# Patient Record
Sex: MALE | Race: BLACK OR AFRICAN AMERICAN | URBAN - METROPOLITAN AREA
[De-identification: names, ages, dates, MRNs, and addresses within clinical notes are randomized per-mention and may not be internally consistent; named-entity substitution may affect disease eponyms.]

---

## 2020-09-01 ENCOUNTER — HISTORICAL (OUTPATIENT)
Dept: LAB | Facility: HOSPITAL | Age: 80
End: 2020-09-01

## 2020-09-13 ENCOUNTER — HOSPITAL ENCOUNTER (OUTPATIENT)
Dept: MEDSURG UNIT | Facility: HOSPITAL | Age: 80
End: 2020-09-17
Attending: INTERNAL MEDICINE | Admitting: INTERNAL MEDICINE

## 2021-06-22 ENCOUNTER — HISTORICAL (OUTPATIENT)
Dept: ADMINISTRATIVE | Facility: HOSPITAL | Age: 81
End: 2021-06-22

## 2021-06-29 ENCOUNTER — HISTORICAL (OUTPATIENT)
Dept: ADMINISTRATIVE | Facility: HOSPITAL | Age: 81
End: 2021-06-29

## 2021-07-06 ENCOUNTER — HISTORICAL (OUTPATIENT)
Dept: ADMINISTRATIVE | Facility: HOSPITAL | Age: 81
End: 2021-07-06

## 2021-07-07 ENCOUNTER — HISTORICAL (OUTPATIENT)
Dept: ADMINISTRATIVE | Facility: HOSPITAL | Age: 81
End: 2021-07-07

## 2022-04-30 NOTE — DISCHARGE SUMMARY
Patient:   Mo Cao             MRN: [id number]            FIN: [id number]               Age:   79 years     Sex:  Male     :  1940   Associated Diagnoses:   Acute CHF (congestive heart failure); Chronic dementia; Diabetes; CAD (coronary artery disease); Protein calorie malnutrition; Hematuria; General weakness; Dehydration   Author:   Jimi Harris MD      Discharge Information      Discharge Summary Information   Admitted  2020   Discharged  2020   Admitting physician     Lakisha Blake MD.     Consulting physician     Suhas Bradshaw MD     Discharge diagnosis     Acute CHF (congestive heart failure) (MGB74-AQ I50.9).     Chronic dementia (TRR32-QW F03.90).     Diabetes (JDY76-DJ E11.9).     CAD (coronary artery disease) (PTQ02-MM I25.10).     Protein calorie malnutrition (NUI62-OR E46).     Hematuria (HDU68-LO R31.9).     General weakness (RMV71-GP R53.1).     Dehydration (USJ13-JX E86.0).     Discharge medications     OTHER MEDICATIONS (Selected)   Prescriptions  Prescribed  Lasix 40 mg oral tablet: 40 mg = 1 tab(s), Oral, Daily, # 30 tab(s), 0 Refill(s), Pharmacy: [id number], 175, cm, Height/Length Dosing, 20 14:46:00 CDT, 84, kg, Weight Dosing, 20 14:46:00 CDT  Documented Medications  Documented  Plavix 75 mg oral tablet: 75 mg = 1 tab(s), Oral, Daily, # 90 tab(s), 0 Refill(s)  ProAir HFA 90 mcg/inh inhalation aerosol with adapter: 90 mcg = 1 puff(s), INH, q4hr, PRN PRN shortness of breath or wheezing, 0 Refill(s)  atorvastatin 40 mg oral tablet: 40 mg = 1 tab(s), Oral, Daily, 0 Refill(s)  carvedilol 6.25 mg oral tablet: 6.25 mg = 1 tab(s), Oral, BID, # 180 tab(s), 0 Refill(s)  donepezil 23 mg oral tablet: 23 mg = 1 tab(s), Oral, Once a day (at bedtime), # 30 tab(s), 0 Refill(s)  hydrALAZINE-isosorbide dinitrate: Oral, TID, 0 Refill(s)  memantine 10 mg oral tablet: 10 mg = 1 tab(s), Oral, BID, # 60 tab(s), 0 Refill(s)  tamsulosin 0.4 mg oral  capsule: 0.4 mg = 1 cap(s), Oral, Daily, # 90 cap(s), 0 Refill(s).        Physical Examination   Vital Signs   9/17/2020 8:00 CDT       Oxygen Therapy            Room air    9/17/2020 7:59 CDT       Temperature Oral          37 DegC                             Temperature Oral (calculated)             98.60 DegF                             Peripheral Pulse Rate     76 bpm                             SpO2                      96 %                             Systolic Blood Pressure   143 mmHg  HI                             Diastolic Blood Pressure  71 mmHg                             Mean Arterial Pressure, Cuff              95 mmHg     General: demented AAM in no acute distress, non-productive cough with insp  Cardiovascular: Regular rate and rhythm, normal peripheral perfusion, BLE edema 2+   Respiratory: CTAB, no tachypnea or accessory muscle use   Abdomen: Soft, nontender, nondistended with positive bowel sounds   Extremities: Warm and well perfused, no clubbing or cyanosis   Neuro: Grossly without focal deficits       Hospital Course     Mr. Cao is a 80 yo AAM with pmhx DMII, HTN, CAD, dementia, BPH and HLD presents to the ED with family with report of dark colored urine and decreased appetite. Patient was just discharged on our service on 9/8 after being admitted for acute renal failure secondary to ATN secondary to dehydration.  His initial creatinine was 11 and improved with IV fluids and discontinuation of diuretics and on discharge his creatinine was 2.4. He was also noted to be iron deficient and given 2 units prbc.  He presents with low appetite and the family is having a very difficult time caring for him and therefore they're requesting nursing home placement.    Initial ED vital signs: Temperature 37, heart rate 82, respirations 18, blood pressure 173/71, oxygenation 99% room air.  Laboratory work remarkable for sodium of 149, chloride 117, CO2 19, creatinine 1.8, , hemoglobin 8.8,  hematocrit 28.9.  UA positive nitrites, trace leuk esterase, 5 WBCs with no bacteria.  Patient was given 2 L of IV fluids in the emergency department and Rocephin and is being admitted to the hospitalist service for further management.  When patient came on the floor he started coughing and became hypoxic with an oxygenation in the 70s and was placed on 2 L nasal cannula with improvement in his oxygenation.  He endorsed orthopnea and does have 2+ bilateral lower extremity edema.  He was on Lasix prior to his last hospitalization it is unclear if he has a history of CHF however his Lasix was discontinued upon discharge.  COVID-19 PCR was negative     Echocardiogram 9/14/2020 showed normal LV size and severely decreased function with EF 25 to 30%.  There was moderate tricuspid regurgitation with RVSP of 61%.  He is responding well to Lasix.  Repeat x-ray showed prominent interstitial markings with no focal opacification and no adverse interval changes. ALsix dose reduced to daily IV due to increased Scr. Cardiology consulted. recommended continue Coreg, Plavix added BiDil and titrate dose as needed. Life vest was not recommended due to dementia. Urine cultures showed Staphylococcus epidermidis likely contaminant. Ceftriaxone discontinued.  BiDil dose was increased due to uncontrolled hypertension.  Will be discharged on 40 mg p.o. Lasix daily.    Patient respiratory symptoms have improved with diuresis and bronchodilator therapy.  His hypoxia resolved and transitioned to room air.  Diet advance as tolerated.   was able to place him in a nursing home.  Prior to discharge all his medications were reconciled.    Acute on chronic systolic decompensated CHF- 25-30%   Acute hypoxic respiratory failure due to above, resolved   UTI (POA), culture showing staph epidermidis, likely contaminant   Acute Kidney Injury, resolved   Metabolic acidosis, non-gap, resolved with Po bicarb, resolved   Chronic Dementia   CAD    T2DM   PCM with poor oral intake   Recent admission for ALEXANDER 2/2 ATN 2/2 dehydration/diuretics   SARS CoV2 UT negative    Time spent on discharge >30 minutes       Discharge Plan   Discharge Summary Plan   Discharge Status: improved.     Discharge instructions given: to patient, written discharge instructions (activity level, diet, follow-up appointment, medications).     Discharge disposition: discharge to skilled nursing facility Nursing Home.     Prescriptions: reviewed with patient, written and given to patient.     Course   Improving.     Progressing as expected.     Well controlled.     Education and Follow-up   Counseled: patient, regarding diagnosis, regarding treatment, regarding medications.     Discharge Planning: Protein-Energy Malnutrition, Living With Heart Failure, Cardiac Rehabilitation, Dementia Caregiver Guide, Dehydration, Adult, Xavier Rodriguez Within 1 week.

## 2022-04-30 NOTE — ED PROVIDER NOTES
"   Patient:   Mo Cao             MRN: [id number]            FIN: [id number]               Age:   79 years     Sex:  Male     :  1940   Associated Diagnoses:   General weakness; Hematuria; Acute UTI; Dehydration   Author:   Sam Goncalves MD      Basic Information   Time seen: Date & time 2020 14:46:00.   History source: Patient, family.   Arrival mode: Private vehicle, wheelchair.   History limitation: Cognitive impairment, Dementia.      History of Present Illness   The patient presents with hematuria, Patient has Dementia. Patient's family states that patient has hematuria and states that he has a decreased appetite (kathy, JOSIAH). ,     Dr Goncalves assumed care 1500    79 male h/o type 2 diabetes, CAD, BPH, possibly congestive heart failure discharge from the hospital 5 days ago after he was admitted with acute renal failure thought to be secondary to ATN due to volume depletion.  On his previous admission creatinine was 11  potassium was elevated at 5.8 phosphorus was 6.9.  At the time he was on Lasix and lactone which were stopped.  Lisinopril resumed when he was discharged.  He returns emergency department today stating that his urine is black.  Also again presents with decreased appetite and decreased oral intake. After further discussion with family patient was sent for worsening weakness, not eating, and multiple falls. At this time the family is not comfortable taking the patient home.  She states she cannot take care of him any longer.  He would not use bedside commode cannot walk to the bathroom cannot or his own meals or care for himself at all.  Any falls at home which is been happening frequently she cannot pick him up and has to call and help.  She requests that they be evaluated for nursing home placement.  The onset was 5  days ago.  The course/duration of symptoms is constant.  Character of hematuria "black".  The degree at onset was moderate.  The degree at " present is moderate.  The exacerbating factor is none.  The relieving factor is none.  Risk factors consist of diabetes mellitus.  Prior episodes: none.  Therapy today: none.  Associated symptoms: none.  Additional history: none.        Review of Systems   Constitutional symptoms:  Negative except as documented in HPI.   Skin symptoms:  Negative except as documented in HPI.   Eye symptoms:  Negative except as documented in HPI.   ENMT symptoms:  Negative except as documented in HPI.   Respiratory symptoms:  Negative except as documented in HPI.   Cardiovascular symptoms:  Negative except as documented in HPI.   Gastrointestinal symptoms:  Negative except as documented in HPI.   Genitourinary symptoms:  Negative except as documented in HPI.   Musculoskeletal symptoms:  Negative except as documented in HPI.   Neurologic symptoms:  Negative except as documented in HPI.   , Unable to obtain due to: Dementia, unable to obtain due to dementia   Health Status   Allergies:    Allergic Reactions (Selected)  No Known Medication Allergies.   Medications:  (Selected)   Documented Medications  Documented  Plavix 75 mg oral tablet: 75 mg = 1 tab(s), Oral, Daily, # 90 tab(s), 0 Refill(s)  atorvastatin 10 mg oral tablet: 10 mg = 1 tab(s), Oral, Daily, # 90 tab(s), 0 Refill(s)  baclofen 10 mg oral tablet: 10 mg = 1 tab(s), Oral, TID, PRN PRN pain, # 90 tab(s), 0 Refill(s)  carvedilol 6.25 mg oral tablet: 6.25 mg = 1 tab(s), Oral, BID, # 180 tab(s), 0 Refill(s)  donepezil 23 mg oral tablet: 23 mg = 1 tab(s), Oral, Once a day (at bedtime), # 30 tab(s), 0 Refill(s)  lisinopril 5 mg oral tablet: 5 mg = 1 tab(s), Oral, Daily, # 90 tab(s), 0 Refill(s)  memantine 10 mg oral tablet: 10 mg = 1 tab(s), Oral, BID, # 60 tab(s), 0 Refill(s)  montelukast 10 mg oral TABLET: 10 mg = 1 tab(s), Oral, qPM, # 30 tab(s), 0 Refill(s)  tamsulosin 0.4 mg oral capsule: 0.4 mg = 1 cap(s), Oral, Daily, # 90 cap(s), 0 Refill(s).      Past Medical/ Family/  Social History   Medical history:    Active  BPH - benign prostatic hyperplasia (1590579600)  DM - Diabetes mellitus (354049010)  HLD - Hyperlipidemia (585969265)  HTN - Hypertension (0226065519)  Dementia (22501470).   Surgical history:    No active procedure history items have been selected or recorded..   Family history:    No family history items have been selected or recorded..   Social history: Tobacco use: Denies, Family/social situation: Unmarried.      Physical Examination               Vital Signs   Vital Signs   9/13/2020 15:15 CDT      Peripheral Pulse Rate     83 bpm                             Heart Rate Monitored      82 bpm                             Respiratory Rate          20 br/min                             SpO2                      99 %                             Oxygen Therapy            Room air                             Systolic Blood Pressure   162 mmHg  HI                             Diastolic Blood Pressure  82 mmHg                             Mean Arterial Pressure, Cuff              109 mmHg  .   Measurements   9/13/2020 14:46 CDT      Weight Dosing             84 kg                             Weight Measured and Calculated in Lbs     185.19 lb                             Weight Estimated          84 kg                             Height/Length Dosing      175 cm                             Height/Length Estimated   175 cm                             Body Mass Index Estimated 27.43 kg/m2  .   Basic Oxygen Information   9/13/2020 15:15 CDT      SpO2                      99 %                             Oxygen Therapy            Room air  General:  Alert, no acute distress   Neurological:  No focal neurological deficit observed, normal speech observed, Cognitive function: To person.    Skin:  Warm, dry   Head:  Normocephalic, atraumatic   Eye:     Cardiovascular:  Regular rate and rhythm, Normal peripheral perfusion, Edema: Bilateral, lower extremity, pitting.    Respiratory:  Lungs  "are clear to auscultation, respirations are non-labored, breath sounds are equal, Symmetrical chest wall expansion.    Musculoskeletal:  No deformity   Gastrointestinal:  Soft, Nontender, Non distended, Normal bowel sounds, Guarding: Negative, Rebound: Negative.    Yellow urine in a container at bedside.Psychiatric:  Cooperative      Medical Decision Making   Differential Diagnosis:  Cystitis, hematuria.    Rationale:  Creatinine is improving from previous.  H&H slightly better than previous.  He had retroperitoneal ultrasound which was unremarkable from his previous admission I reviewed the radiologist interpretation of that.  Suspect he has a UTI.  I given him a dose of Rocephin in the ED.  Discussed with the hospitalist regarding admission for possible nursing home placement as his family cannot take care of him at home.  I discussed with her that treating the suspected UTI may improve the weakness and he may begin to eat more after that she still does not feel clinical take him home and would like him to be placed in a nursing home.   Documents reviewed:  Emergency department nurses' notes.   Orders  Launch Order Profile (Selected)   Inpatient Orders  Ordered  30 Day Readmission: 09/13/20 14:51:32 CDT, Stop date 09/13/20 14:51:32 CDT, "This patient has had an inpatient, observation, outpatient bedded or emergency visit within the last 30 days."  Normal Saline (0.9% NS) IV 1,000 mL: 1,000 mL, 1,000 mL, IV, 1,000 mL/hr, start date 09/13/20 17:18:00 CDT, 2, m2  Completed  Automated Diff: Now collect, 09/13/20 14:51:00 CDT, Blood, Collected, Stop date 09/13/20 14:51:00 CDT, Lab Collect, Print Label By Order Location, 09/13/20 14:51:00 CDT  CBC w/ Auto Diff: Now collect, 09/13/20 14:51:00 CDT, Blood, Stop date 09/13/20 14:51:00 CDT, Lab Collect, Print Label By Order Location, 09/13/20 14:51:00 CDT  CMP: Stat collect, 09/13/20 14:51:00 CDT, Blood, Stop date 09/13/20 14:51:00 CDT, Lab Collect, Print Label By Order " Location, 09/13/20 14:51:00 CDT  CPK: Stat collect, 09/13/20 17:17:00 CDT, Blood, Stop date 09/13/20 17:18:00 CDT, Lab Collect, Print Label By Order Location, 09/13/20 17:17:00 CDT  Estimated Glomerular Filtration Rate: Stat collect, 09/13/20 14:51:00 CDT, Blood, Collected, Stop date 09/13/20 14:51:00 CDT, Lab Collect, Print Label By Order Location, 09/13/20 14:51:00 CDT  INR - Protime: Stat collect, 09/13/20 14:51:00 CDT, Blood, Stop date 09/13/20 14:51:00 CDT, Lab Collect, Print Label By Order Location, 09/13/20 14:51:00 CDT  PTT: Stat collect, 09/13/20 14:51:00 CDT, Blood, Stop date 09/13/20 14:51:00 CDT, Lab Collect, Print Label By Order Location, 09/13/20 14:51:00 CDT  Sodium Chloride 0.9% intravenous solution: 100 mL, Soln, N/A, Once, Stop date 09/13/20 17:57:30 CDT, Physician Stop, 09/13/20 17:57:30 CDT  UA with Reflex: Stat collect, Urine, 09/13/20 14:51:00 CDT, Stop date 09/13/20 14:51:00 CDT, Nurse collect, Print Label By Order Location  cefTRIAXone: 1 gm, 1 EA, Injection, N/A, Once, Stop date 09/13/20 17:57:06 CDT, Physician Stop, 09/13/20 17:57:06 CDT  cefTRIAXone: 1 gm, IV Piggyback, Once, Infuse over: 1 hr, first dose 09/13/20 17:51:00 CDT, stop date 09/13/20 17:51:00 CDT, STAT.   Results review:  Lab results : Lab View   9/13/2020 15:44 CDT      UA Appear                 CLEAR                             UA Color                  YELLOW                             UA Spec Grav              1.020                             UA Bili                   Negative                             UA pH                     5.0                             UA Urobilinogen           1.0                             UA Blood                  Trace                             UA Glucose                Negative                             UA Ketones                Trace                             UA Protein                1+                             UA Nitrite                Positive                             UA  Leuk Est               Trace                             UA WBC                    5 /HPF  HI                             UA RBC                    NONE SEEN                             UA Bacteria               NONE SEEN /HPF                             UA Squam Epithelial       NONE SEEN    9/13/2020 14:51 CDT      Sodium Lvl                149 mmol/L  HI                             Potassium Lvl             4.1 mmol/L                             Chloride                  117 mmol/L  HI                             CO2                       19 mmol/L  LOW                             Calcium Lvl               8.4 mg/dL  LOW                             Glucose Lvl               91 mg/dL                             BUN                       25.0 mg/dL                             Creatinine                1.80 mg/dL  HI                             eGFR-AA                   47  NA                             eGFR-ELEAZAR                  39  NA                             Bili Total                0.4 mg/dL                             Bili Direct               0.2 mg/dL                             Bili Indirect             0.20 mg/dL                             AST                       31 unit/L                             ALT                       17 unit/L                             Alk Phos                  50 unit/L                             Total Protein             6.4 gm/dL                             Albumin Lvl               2.9 gm/dL  LOW                             Globulin                  3.5 gm/dL                             A/G Ratio                 0.8 ratio  LOW                             Total CK                  424 U/L  HI                             PT                        14.3 second(s)  HI                             INR                       1.2                             PTT                       24.4 second(s)                             WBC                       5.2 x10(3)/mcL                              RBC                       3.05 x10(6)/mcL  LOW                             Hgb                       8.8 gm/dL  LOW                             Hct                       28.9 %  LOW                             Platelet                  141 x10(3)/mcL                             MCV                       94.8 fL  HI                             MCH                       28.9 pg                             MCHC                      30.4 gm/dL  LOW                             RDW                       16.7 %                             MPV                       11.5 fL                             Abs Neut                  3.05 x10(3)/mcL                             Neutro Auto               58 %  NA                             Lymph Auto                27 %  NA                             Mono Auto                 13 %  NA                             Eos Auto                  1 %  NA                             Abs Eos                   0.0 x10(3)/mcL                             Basophil Auto             1 %  NA                             Abs Neutro                3.05 x10(3)/mcL                             Abs Lymph                 1.4 x10(3)/mcL                             Abs Mono                  0.7 x10(3)/mcL                             Abs Baso                  0.0 x10(3)/mcL  .      Impression and Plan   Diagnosis   General weakness (WOV44-EV R53.1)   Hematuria (UOZ02-NR R31.9)   Dehydration (WTG98-AP E86.0)   Acute UTI (QUC17-WH N39.0)      Calls-Consults   -  My Lee.   Plan   Condition: Improved, Stable.    Disposition: Admit.    Counseled: Patient, Regarding diagnosis, Regarding diagnostic results, Regarding treatment plan, Patient indicated understanding of instructions.    Notes: I, Maria Dolores Suh, acted solely as a scribe for and in the presence of Dr. Goncalves who performed the service.     , I, Dr Goncalves have read note from scribe and I agree with history and  physical except as amended by me.  All information was dictated from my history and my examination of patient..

## 2024-03-05 NOTE — HISTORICAL [facility] CERNER
Hpi Title: Evaluation of Skin Lesions This is a historical note converted from Joan. Formatting and pictures may have been removed.  Please reference Cernazario for original formatting and attached multimedia. Chief Complaint  family reports hematuria yestday and today urine is black. no appetite. d/cd on monday for acute renal failure. pt confused, hx of dementia  History of Present Illness  Mr. Cao is a 78 yo AAM with pmhx DMII, HTN, CAD, dementia, BPH and HLD presents to the ED with family with report of?dark colored urine?and decreased appetite.?Patient was just discharged on our service on 9/8 after being admitted for acute renal failure secondary to ATN secondary to dehydration.??His initial creatinine was 11?and improved with IV fluids?and discontinuation of diuretics?and on discharge his creatinine was 2.4. He was also noted to be iron deficient and given 2 units prbc.? He presents tonight with family stating that he does not want to do anything and he is not eating and drinking?and his urine is?black. ?The family is having a very difficult time caring for him and therefore theyre requesting?nursing home placement.  ?  Initial ED vital signs: Temperature 37, heart rate 82, respirations 18, blood pressure 173/71, oxygenation 99% room air.? Laboratory work remarkable for sodium of 149, chloride 117, CO2 19, creatinine 1.8, , hemoglobin 8.8, hematocrit 28.9.? UA positive nitrites, trace leuk esterase, 5 WBCs with no bacteria.? Patient was given 2 L of IV fluids in the emergency department and Rocephin and is being admitted to the hospitalist service for further management.?  ?  ?  When patient came on the floor he started coughing and became hypoxic with an oxygenation in the 70s and was placed on 2 L nasal cannula with improvement in his oxygenation.? He endorses orthopnea and does have 2+ bilateral lower extremity edema.? He was on Lasix prior to?his?last?hospitalization it is unclear if he has a history of?CHF however his Lasix was  discontinued upon discharge. He is currently having an isp wheeze and has BLE edema.  ?  ?  Review of Systems  All review of systems?obtained and negative except as stated above  Physical Exam  Vitals & Measurements  T:?36.9? ?C (Oral)? TMIN:?36.7? ?C (Oral)? TMAX:?37? ?C (Oral)? HR:?89(Peripheral)? RR:?18? BP:?143/95? SpO2:?99%? WT:?84?kg?  General:?demeneted AAM in no acute distress, non-productive cough with insp  HEENT:?Normocephalic atraumatic,?pupils equal and reactive  Cardiovascular: Regular rate and rhythm, normal peripheral perfusion, BLE edema 2+  Respiratory: insp wheezing, diminished BS to bilateral bases,?no tachypnea or accessory muscle use  Abdomen:?Soft, nontender, nondistended?with positive bowel sounds  Extremities: Warm and well perfused, no clubbing or cyanosis  Neuro:?Grossly without focal deficits  Skin:?Warm, dry and intact  Psych:?Cooperative  ?  Assessment/Plan  Acute hypoxic respiratory failure  Acute CHF- unknown EF  UTI (POA)  Acute Kidney Injury- improving  Metabolic acidosis, non-gap  Hypernatremia  Elevated CPK  Dementia  PCM with poor oral intake  Recent admission for ALEXANDER 2/2 ATN 2/2 dehydration/diuretics  ?  PLAN:  - supplemental 02 to keep sats >92%. Solu-Medrol 125mg IV x1.  - [place] CXR with congestion.  - Lasix 40mg IV BID. Echocardiogram in AM. strict I&Os, daily weight.  - BCx2. UC. COVID 19 pending, although low suspicion. Maintain appropriate isolation precautions until result back  - IV Rocephin 1gm Q24H, Day #1  - CM consult for NH placement per family request  - Will resume home meds when able to get accurate list from family.  - AM labs  ?  I, Paulette Alaniz, MICHELLEP-C have reviewed and discussed this case with Dr. Horne  ?  DVT prophylaxis: Lovenox  CODE STATUS: Unknown  PCP:?Non-Staff  ----------------  Randell Horne MD  ?   I reviewed the NP documentation, treatment plan, and medical decision making; and I had face-to-face time with this patient.??Labs and imaging were  reviewed and I agree with history, physical and medical decision making as detailed above.  ?  On exam, has bilateral?expiratory wheezing?and bibasilar rales,?+2 pedal edema  ?  Acute congestive heart failure--- had recent?ALEXANDER?which required aggressive volume resuscitation. ?Continue IV diuresis?with Lasix 40 IV twice daily, echocardiogram. ?Continue ceftriaxone for UTI. ?Add p.o. sodium bicarb-non-gap metabolic acidosis with hyperchloremia.  SARS-CoV-2 PCR pending  ?  ?Critical care time:?40 min was provided in order to assess and manage a high probability of imminent or life-threatening deterioration of cardiorespiratory status.  Critical care diagnosis:?Acute CHF requiring IV diuresis   Problem List/Past Medical History  Essential HTN  Diabetes mellitus type II  HLD  Dementia  BPH  CAD  ?  Procedure/Surgical History  unable to obtain due to dementia  Medications  Inpatient  acetaminophen, 1000 mg= 2 tab(s), Oral, q6hr, PRN  carvedilol, 6.25 mg= 2 tab(s), Oral, BID  donepezil, 23 mg, Oral, Once a day (at bedtime)  DuoNeb, 3 mL, NEB, Once  Lasix, 40 mg= 4 mL, IV Push, BID  Plavix 75 mg oral tablet, 75 mg= 1 tab(s), Oral, Daily  ProAir HFA 90 mcg/inh inhalation aerosol with adapter, 90 mcg= 1 puff(s), INH, q4hr, PRN  Rocephin (for IVPB)  Solumedrol IV push / IM, 125 mg= 2 mL, IV Push, Once  tamsulosin 0.4 mg oral capsule, 0.4 mg= 1 cap(s), Oral, Daily  Zofran, 4 mg= 2 mL, IV Push, q4hr, PRN  Home  atorvastatin 10 mg oral tablet, 10 mg= 1 tab(s), Oral, Daily,? ?Investigating  baclofen 10 mg oral tablet, 10 mg= 1 tab(s), Oral, TID, PRN,? ?Investigating  carvedilol 6.25 mg oral tablet, 6.25 mg= 1 tab(s), Oral, BID,? ?Investigating  donepezil 23 mg oral tablet, 23 mg= 1 tab(s), Oral, Once a day (at bedtime),? ?Investigating  lisinopril 5 mg oral tablet, 5 mg= 1 tab(s), Oral, Daily,? ?Investigating  memantine 10 mg oral tablet, 10 mg= 1 tab(s), Oral, BID,? ?Investigating  montelukast 10 mg oral TABLET, 10 mg= 1 tab(s),  Oral, qPM,? ?Investigating  Plavix 75 mg oral tablet, 75 mg= 1 tab(s), Oral, Daily,? ?Investigating  tamsulosin 0.4 mg oral capsule, 0.4 mg= 1 cap(s), Oral, Daily,? ?Investigating  Allergies  No Known Medication Allergies  Social History  Abuse/Neglect  No, 09/13/2020  No, 09/13/2020  No, 09/01/2020  Alcohol  Past, Beer, 09/13/2020  Substance Use  Never, 09/13/2020  Tobacco  Never (less than 100 in lifetime), N/A, 09/13/2020  Never (less than 100 in lifetime), No, 09/13/2020  Never (less than 100 in lifetime), N/A, 09/01/2020  Family History  unable to obtain  Lab Results  Labs Last 24 Hours?  ?Chemistry? Hematology/Coagulation?   Sodium Lvl:?149 mmol/L?High (09/13/20 14:51:00) PT:?14.3 second(s)?High (09/13/20 14:51:00)   Potassium Lvl: 4.1 mmol/L (09/13/20 14:51:00) INR: 1.2 (09/13/20 14:51:00)   Chloride:?117 mmol/L?High (09/13/20 14:51:00) PTT: 24.4 second(s) (09/13/20 14:51:00)   CO2:?19 mmol/L?Low (09/13/20 14:51:00) WBC: 5.2 x10(3)/mcL (09/13/20 14:51:00)   Calcium Lvl:?8.4 mg/dL?Low (09/13/20 14:51:00) RBC:?3.05 x10(6)/mcL?Low (09/13/20 14:51:00)   Glucose Lvl: 91 mg/dL (09/13/20 14:51:00) Hgb:?8.8 gm/dL?Low (09/13/20 14:51:00)   BUN: 25 mg/dL (09/13/20 14:51:00) Hct:?28.9 %?Low (09/13/20 14:51:00)   Creatinine:?1.8 mg/dL?High (09/13/20 14:51:00) Platelet: 141 x10(3)/mcL (09/13/20 14:51:00)   eGFR-AA: 47 (09/13/20 14:51:00) MCV:?94.8 fL?High (09/13/20 14:51:00)   eGFR-ELEAZAR: 39 (09/13/20 14:51:00) MCH: 28.9 pg (09/13/20 14:51:00)   Bili Total: 0.4 mg/dL (09/13/20 14:51:00) MCHC:?30.4 gm/dL?Low (09/13/20 14:51:00)   Bili Direct: 0.2 mg/dL (09/13/20 14:51:00) RDW: 16.7 % (09/13/20 14:51:00)   Bili Indirect: 0.2 mg/dL (09/13/20 14:51:00) MPV: 11.5 fL (09/13/20 14:51:00)   AST: 31 unit/L (09/13/20 14:51:00) Abs Neut: 3.05 x10(3)/mcL (09/13/20 14:51:00)   ALT: 17 unit/L (09/13/20 14:51:00) Neutro Auto: 58 % (09/13/20 14:51:00)   Alk Phos: 50 unit/L (09/13/20 14:51:00) Lymph Auto: 27 % (09/13/20 14:51:00)   Total  Protein: 6.4 gm/dL (09/13/20 14:51:00) Mono Auto: 13 % (09/13/20 14:51:00)   Albumin Lvl:?2.9 gm/dL?Low (09/13/20 14:51:00) Eos Auto: 1 % (09/13/20 14:51:00)   Globulin: 3.5 gm/dL (09/13/20 14:51:00) Abs Eos: 0 x10(3)/mcL (09/13/20 14:51:00)   A/G Ratio:?0.8 ratio?Low (09/13/20 14:51:00) Basophil Auto: 1 % (09/13/20 14:51:00)   Lactic Acid Lvl: 1.4 mmol/L (09/13/20 23:18:00) Abs Neutro: 3.05 x10(3)/mcL (09/13/20 14:51:00)   Total CK:?424 U/L?High (09/13/20 14:51:00) Abs Lymph: 1.4 x10(3)/mcL (09/13/20 14:51:00)    Abs Mono: 0.7 x10(3)/mcL (09/13/20 14:51:00)    Abs Baso: 0 x10(3)/mcL (09/13/20 14:51:00)